# Patient Record
(demographics unavailable — no encounter records)

---

## 2025-06-03 NOTE — PLAN
[FreeTextEntry1] : Mrs. Montanez presents for follow-up evaluation.  1.  The patient has lost 60 pounds since 1/24. 2.  Patient is currently on losartan 50 mg daily for hypertension.  Blood pressure was 110/80.  Losartan will be decreased to 25 mg daily.  Patient will follow-up in 6 weeks for blood pressure check.  Pending review of repeat blood pressure she may have able to come off losartan therapy completely. 3.  Check CBC, CMP, hemoglobin A1c, iron levels, lipid profile, TSH, vitamin D 4.  GYN follow-up for mammogram, bone density and Pap smear. 5.  Follow-up in this office in 6 months.

## 2025-06-03 NOTE — HISTORY OF PRESENT ILLNESS
[FreeTextEntry1] : Follow-up [de-identified] : Mrs. Montanez presents for a follow-up evaluation.  She has lost 60 pounds since 1/5/2024 through lifestyle modification with diet and exercise.  She did not take a GLP-1 agonist.  Patient denies any chest pain, shortness of breath or palpitations.  She is feeling well.

## 2025-07-15 NOTE — PLAN
[FreeTextEntry1] : Patient to remain off HTN medication as BP today was WNL BW and carotid studies reviewed low fat low chol diet emphasized  Exercise encouraged  CV risk <2% F/U 12/2025 or sooner if necessary

## 2025-07-15 NOTE — HISTORY OF PRESENT ILLNESS
[FreeTextEntry1] : BP check/ results review [de-identified] : Patient presents to office today for bp check and results review.  The patient has lost 60 pounds through diet and exercise. She has a history of HTN for which she was taking losartan 50mg. Pt was instructed to decrease dose to 25mg and RTO for bp check. RX for 25mg was never received at pharmacy and pt has been off all HTN meds for approx. 2 months.   No complaints today